# Patient Record
Sex: FEMALE | Race: WHITE | NOT HISPANIC OR LATINO | Employment: OTHER | URBAN - METROPOLITAN AREA
[De-identification: names, ages, dates, MRNs, and addresses within clinical notes are randomized per-mention and may not be internally consistent; named-entity substitution may affect disease eponyms.]

---

## 2017-08-28 ENCOUNTER — POST-OP (OUTPATIENT)
Dept: URBAN - METROPOLITAN AREA CLINIC 46 | Facility: CLINIC | Age: 67
End: 2017-08-28

## 2017-08-28 DIAGNOSIS — Z96.1: ICD-10-CM

## 2017-08-28 PROCEDURE — 99024 POSTOP FOLLOW-UP VISIT: CPT

## 2017-08-28 RX ORDER — FLUOROMETHOLONE 2.5 MG/ML: 1 SUSPENSION/ DROPS OPHTHALMIC EVERY MORNING

## 2017-08-28 ASSESSMENT — VISUAL ACUITY
OD_SC: J12
OS_PH: 20/40+1
OD_SC: 20/40
OS_SC: 20/60-1
OD_PH: 20/30
OS_SC: J12

## 2017-08-28 ASSESSMENT — TONOMETRY
OD_IOP_MMHG: 14
OS_IOP_MMHG: 14

## 2018-03-01 ENCOUNTER — FOLLOW UP (OUTPATIENT)
Dept: URBAN - METROPOLITAN AREA CLINIC 46 | Facility: CLINIC | Age: 68
End: 2018-03-01

## 2018-03-01 DIAGNOSIS — H18.51: ICD-10-CM

## 2018-03-01 DIAGNOSIS — H02.836: ICD-10-CM

## 2018-03-01 DIAGNOSIS — Z98.890: ICD-10-CM

## 2018-03-01 DIAGNOSIS — H02.833: ICD-10-CM

## 2018-03-01 PROCEDURE — 92012 INTRM OPH EXAM EST PATIENT: CPT

## 2018-03-01 ASSESSMENT — VISUAL ACUITY
OS_CC: J5
OD_CC: J3
OS_CC: 20/40
OD_SC: J10
OS_SC: 20/70
OD_SC: 20/50-2
OD_CC: 20/40-2
OS_SC: J>10

## 2018-03-01 ASSESSMENT — TONOMETRY
OD_IOP_MMHG: 14
OS_IOP_MMHG: 13

## 2018-03-26 ENCOUNTER — CONSULT (OUTPATIENT)
Dept: URBAN - METROPOLITAN AREA CLINIC 43 | Facility: CLINIC | Age: 68
End: 2018-03-26

## 2018-03-26 VITALS
HEIGHT: 55 IN | HEART RATE: 87 BPM | SYSTOLIC BLOOD PRESSURE: 148 MMHG | DIASTOLIC BLOOD PRESSURE: 85 MMHG | RESPIRATION RATE: 14 BRPM

## 2018-03-26 DIAGNOSIS — H02.836: ICD-10-CM

## 2018-03-26 DIAGNOSIS — H02.833: ICD-10-CM

## 2018-03-26 DIAGNOSIS — H18.51: ICD-10-CM

## 2018-03-26 PROCEDURE — 92014 COMPRE OPH EXAM EST PT 1/>: CPT

## 2018-03-26 RX ORDER — MOXIFLOXACIN HYDROCHLORIDE 5 MG/ML: 1 SOLUTION/ DROPS OPHTHALMIC

## 2018-03-26 RX ORDER — NEPAFENAC 3 MG/ML: 1 SUSPENSION/ DROPS OPHTHALMIC ONCE A DAY

## 2018-03-26 ASSESSMENT — TONOMETRY
OD_IOP_MMHG: 14
OS_IOP_MMHG: 14

## 2018-03-26 ASSESSMENT — VISUAL ACUITY
OS_SC: 20/200
OD_SC: 20/50+2

## 2019-03-06 ENCOUNTER — ESTABLISHED COMPREHENSIVE EXAM (OUTPATIENT)
Dept: URBAN - METROPOLITAN AREA CLINIC 46 | Facility: CLINIC | Age: 69
End: 2019-03-06

## 2019-03-06 DIAGNOSIS — H52.7: ICD-10-CM

## 2019-03-06 DIAGNOSIS — E11.9: ICD-10-CM

## 2019-03-06 PROCEDURE — 92014 COMPRE OPH EXAM EST PT 1/>: CPT

## 2019-03-06 PROCEDURE — 92015 DETERMINE REFRACTIVE STATE: CPT

## 2019-03-06 ASSESSMENT — VISUAL ACUITY
OD_SC: 20/50
OD_SC: J10
OD_PH: 20/40-1
OS_PH: 20/30-2
OS_SC: J10
OS_SC: 20/60-1

## 2019-03-06 ASSESSMENT — TONOMETRY
OD_IOP_MMHG: 12
OS_IOP_MMHG: 15

## 2019-10-14 NOTE — PATIENT DISCUSSION
"""Elevated IOP OU- patient states that she only used drop twice secondary to eyes being red after ""

## 2019-12-02 NOTE — PATIENT DISCUSSION
"""Reassured patient that intraocular pressures are at target levels and other ocular findings are stable. Continue present management and/or medication(s).  Follow up as directed. "" "" ."""

## 2020-01-13 ENCOUNTER — CORNEA CONSULT (OUTPATIENT)
Dept: URBAN - METROPOLITAN AREA CLINIC 43 | Facility: CLINIC | Age: 70
End: 2020-01-13

## 2020-01-13 VITALS — HEART RATE: 89 BPM | SYSTOLIC BLOOD PRESSURE: 151 MMHG | DIASTOLIC BLOOD PRESSURE: 91 MMHG | HEIGHT: 55 IN

## 2020-01-13 DIAGNOSIS — H18.51: ICD-10-CM

## 2020-01-13 DIAGNOSIS — H52.7: ICD-10-CM

## 2020-01-13 PROCEDURE — 92286 ANT SGM IMG I&R SPECLR MIC: CPT

## 2020-01-13 PROCEDURE — 92015 DETERMINE REFRACTIVE STATE: CPT

## 2020-01-13 PROCEDURE — 92014 COMPRE OPH EXAM EST PT 1/>: CPT

## 2020-01-13 RX ORDER — NEPAFENAC 3 MG/ML: 1 SUSPENSION/ DROPS OPHTHALMIC ONCE A DAY

## 2020-01-13 RX ORDER — MOXIFLOXACIN HYDROCHLORIDE 5 MG/ML: 1 SOLUTION/ DROPS OPHTHALMIC

## 2020-01-13 RX ORDER — PREDNISOLONE ACETATE 10 MG/ML: 1 SUSPENSION/ DROPS OPHTHALMIC

## 2020-01-13 ASSESSMENT — VISUAL ACUITY
OS_CC: J4
OU_SC: J10
OD_CC: J6
OU_CC: J1-
OS_PH: 20/40
OS_BAT: 20/80
OS_SC: 20/70-2
OD_SC: 20/40-1
OD_BAT: 20/60
OD_SC: J10
OU_SC: 20/40+1
OS_SC: <J12

## 2020-01-13 ASSESSMENT — TONOMETRY
OS_IOP_MMHG: 15
OD_IOP_MMHG: 15

## 2020-01-29 ENCOUNTER — ESTABLISHED PATIENT (OUTPATIENT)
Dept: URBAN - METROPOLITAN AREA SURGERY 14 | Facility: SURGERY | Age: 70
End: 2020-01-29

## 2020-01-29 ENCOUNTER — SURGERY/PROCEDURE (OUTPATIENT)
Dept: URBAN - METROPOLITAN AREA CLINIC 43 | Facility: CLINIC | Age: 70
End: 2020-01-29

## 2020-01-29 DIAGNOSIS — H18.51: ICD-10-CM

## 2020-01-29 PROCEDURE — 99211HP H&P OFFICE/OUTPATIENT VISIT, EST

## 2020-01-29 PROCEDURE — 65756 CORNEAL TRNSPL ENDOTHELIAL: CPT

## 2020-01-30 ENCOUNTER — 1 DAY POST-OP (OUTPATIENT)
Dept: URBAN - METROPOLITAN AREA CLINIC 46 | Facility: CLINIC | Age: 70
End: 2020-01-30

## 2020-01-30 DIAGNOSIS — Z94.7: ICD-10-CM

## 2020-01-30 PROCEDURE — 99024 POSTOP FOLLOW-UP VISIT: CPT

## 2020-01-30 ASSESSMENT — TONOMETRY: OS_IOP_MMHG: 15

## 2020-01-30 ASSESSMENT — VISUAL ACUITY
OD_SC: 20/>400
OS_SC: 20/70

## 2020-02-03 ENCOUNTER — 1 DAY POST-OP (OUTPATIENT)
Dept: URBAN - METROPOLITAN AREA CLINIC 46 | Facility: CLINIC | Age: 70
End: 2020-02-03

## 2020-02-03 DIAGNOSIS — Z94.7: ICD-10-CM

## 2020-02-03 PROCEDURE — 99024 POSTOP FOLLOW-UP VISIT: CPT

## 2020-02-03 ASSESSMENT — TONOMETRY: OD_IOP_MMHG: 16

## 2020-02-03 ASSESSMENT — VISUAL ACUITY
OS_SC: 20/70
OS_SC: J12
OD_PH: 20/60
OU_SC: 20/60-2
OD_SC: <J12
OD_SC: 20/70

## 2020-02-10 ENCOUNTER — POST-OP (OUTPATIENT)
Dept: URBAN - METROPOLITAN AREA CLINIC 43 | Facility: CLINIC | Age: 70
End: 2020-02-10

## 2020-02-10 DIAGNOSIS — Z98.890: ICD-10-CM

## 2020-02-10 PROCEDURE — 99024 POSTOP FOLLOW-UP VISIT: CPT

## 2020-02-10 RX ORDER — MOXIFLOXACIN HYDROCHLORIDE 5 MG/ML: 1 SOLUTION/ DROPS OPHTHALMIC

## 2020-02-10 ASSESSMENT — TONOMETRY
OD_IOP_MMHG: 16
OS_IOP_MMHG: 17

## 2020-02-10 ASSESSMENT — VISUAL ACUITY
OS_SC: 20/70-1
OD_SC: 20/100
OD_PH: 20/70+1

## 2020-03-02 ENCOUNTER — POST-OP (OUTPATIENT)
Dept: URBAN - METROPOLITAN AREA CLINIC 46 | Facility: CLINIC | Age: 70
End: 2020-03-02

## 2020-03-02 DIAGNOSIS — Z98.890: ICD-10-CM

## 2020-03-02 PROCEDURE — 99024 POSTOP FOLLOW-UP VISIT: CPT

## 2020-03-02 ASSESSMENT — TONOMETRY
OS_IOP_MMHG: 17
OD_IOP_MMHG: 17

## 2020-03-02 ASSESSMENT — VISUAL ACUITY
OD_PH: 20/40+1
OS_SC: 20/70
OD_SC: 20/80
OS_PH: 20/40-1

## 2020-04-13 ENCOUNTER — POST-OP (OUTPATIENT)
Dept: URBAN - METROPOLITAN AREA CLINIC 46 | Facility: CLINIC | Age: 70
End: 2020-04-13

## 2020-04-13 DIAGNOSIS — Z98.890: ICD-10-CM

## 2020-04-13 PROCEDURE — 99024 POSTOP FOLLOW-UP VISIT: CPT

## 2020-04-13 ASSESSMENT — VISUAL ACUITY
OS_SC: 20/60
OD_PH: 20/50
OS_PH: 20/40
OS_SC: J5
OD_SC: 20/100
OD_SC: J10

## 2020-04-13 ASSESSMENT — TONOMETRY
OS_IOP_MMHG: 20
OD_IOP_MMHG: 20

## 2020-12-30 ENCOUNTER — ESTABLISHED COMPREHENSIVE EXAM (OUTPATIENT)
Dept: URBAN - METROPOLITAN AREA CLINIC 46 | Facility: CLINIC | Age: 70
End: 2020-12-30

## 2020-12-30 DIAGNOSIS — E11.9: ICD-10-CM

## 2020-12-30 DIAGNOSIS — Z01.00: ICD-10-CM

## 2020-12-30 DIAGNOSIS — H52.7: ICD-10-CM

## 2020-12-30 PROCEDURE — 92015 DETERMINE REFRACTIVE STATE: CPT

## 2020-12-30 PROCEDURE — 92014 COMPRE OPH EXAM EST PT 1/>: CPT

## 2020-12-30 RX ORDER — PREDNISOLONE ACETATE 10 MG/ML: 1 SUSPENSION/ DROPS OPHTHALMIC

## 2020-12-30 ASSESSMENT — VISUAL ACUITY
OD_SC: 20/400
OS_SC: >J12
OS_PH: 20/30
OS_CC: J1
OD_PH: 20/50
OD_SC: >J12
OS_SC: 20/70-2
OD_CC: J6

## 2020-12-30 ASSESSMENT — TONOMETRY
OS_IOP_MMHG: 16
OD_IOP_MMHG: 17

## 2021-05-05 NOTE — PATIENT DISCUSSION
will watch.  continue timolol bid ou.  needs rv soon.  no showed times 2.  please call office for pressure check soon.

## 2022-03-31 ENCOUNTER — COMPREHENSIVE EXAM (OUTPATIENT)
Dept: URBAN - METROPOLITAN AREA CLINIC 46 | Facility: CLINIC | Age: 72
End: 2022-03-31

## 2022-03-31 DIAGNOSIS — Z94.7: ICD-10-CM

## 2022-03-31 DIAGNOSIS — Z01.00: ICD-10-CM

## 2022-03-31 DIAGNOSIS — H52.7: ICD-10-CM

## 2022-03-31 DIAGNOSIS — E11.9: ICD-10-CM

## 2022-03-31 PROCEDURE — 92015 DETERMINE REFRACTIVE STATE: CPT

## 2022-03-31 PROCEDURE — 92014 COMPRE OPH EXAM EST PT 1/>: CPT

## 2022-03-31 ASSESSMENT — VISUAL ACUITY
OD_SC: 20/200
OS_SC: 20/60
OS_CC: 20/40-1
OD_CC: 20/50-1
OS_SC: <J10
OD_PH: 20/30-1
OD_SC: <J10
OS_CC: J5
OD_CC: J3

## 2022-03-31 ASSESSMENT — TONOMETRY
OS_IOP_MMHG: 17
OD_IOP_MMHG: 17

## 2022-07-29 NOTE — PATIENT DISCUSSION
BASIC OS/OD/DIST OU: Discussed with patient in detail laser-assisted vs traditional cataract surgery and all available lens options as well as their associated risks, benefits, limitations and out-of-pocket costs. Patient wishes to proceed with cataract surgery with the Basic option OS. Proceed with cataract surgery OD with Basic option, based on confirmation of first eye results, and patient's complaint. The patient understands that a monofocal IOL will allow him/her to see clearly at one focal point and elects to be best corrected at distance. Patient understands that glasses will be needed full time for near and intermediate work after surgery and will likely still be needed to see their best at distance. The risks, benefits, and alternatives to surgery were discussed and the patient's questions were answered.

## 2023-04-03 ENCOUNTER — COMPREHENSIVE EXAM (OUTPATIENT)
Dept: URBAN - METROPOLITAN AREA CLINIC 46 | Facility: CLINIC | Age: 73
End: 2023-04-03

## 2023-04-03 DIAGNOSIS — Z94.7: ICD-10-CM

## 2023-04-03 DIAGNOSIS — H52.7: ICD-10-CM

## 2023-04-03 DIAGNOSIS — E11.9: ICD-10-CM

## 2023-04-03 DIAGNOSIS — Z98.890: ICD-10-CM

## 2023-04-03 DIAGNOSIS — Z96.1: ICD-10-CM

## 2023-04-03 DIAGNOSIS — H35.54: ICD-10-CM

## 2023-04-03 DIAGNOSIS — Z01.00: ICD-10-CM

## 2023-04-03 PROCEDURE — 92014 COMPRE OPH EXAM EST PT 1/>: CPT

## 2023-04-03 PROCEDURE — 92015 DETERMINE REFRACTIVE STATE: CPT

## 2023-04-03 PROCEDURE — 92134 CPTRZ OPH DX IMG PST SGM RTA: CPT

## 2023-04-03 ASSESSMENT — VISUAL ACUITY
OS_CC: J2
OS_CC: 20/25
OD_SC: 20/60
OD_CC: 20/40-2
OD_CC: J3
OS_SC: 20/30-1
OD_PH: 20/30-2
OU_CC: J2

## 2023-04-03 ASSESSMENT — TONOMETRY
OD_IOP_MMHG: 17
OS_IOP_MMHG: 17

## 2023-04-24 ENCOUNTER — FOLLOW UP (OUTPATIENT)
Dept: URBAN - METROPOLITAN AREA CLINIC 46 | Facility: CLINIC | Age: 73
End: 2023-04-24

## 2023-04-24 DIAGNOSIS — Z94.7: ICD-10-CM

## 2023-04-24 DIAGNOSIS — Z98.890: ICD-10-CM

## 2023-04-24 DIAGNOSIS — Z01.00: ICD-10-CM

## 2023-04-24 DIAGNOSIS — Z96.1: ICD-10-CM

## 2023-04-24 DIAGNOSIS — H52.7: ICD-10-CM

## 2023-04-24 DIAGNOSIS — E11.9: ICD-10-CM

## 2023-04-24 DIAGNOSIS — H35.54: ICD-10-CM

## 2023-04-24 PROCEDURE — 92012 INTRM OPH EXAM EST PATIENT: CPT

## 2023-04-24 ASSESSMENT — TONOMETRY
OD_IOP_MMHG: 18
OS_IOP_MMHG: 17

## 2023-04-24 ASSESSMENT — VISUAL ACUITY
OU_CC: 20/20
OD_CC: 20/40-1
OS_CC: 20/20
OD_PH: 20/25

## 2023-12-12 NOTE — PATIENT DISCUSSION
Continue taking Lutein. I reviewed the H&P, I examined the patient, and there are no changes in the patient's condition.

## 2024-03-18 ENCOUNTER — COMPREHENSIVE EXAM (OUTPATIENT)
Dept: URBAN - METROPOLITAN AREA CLINIC 46 | Facility: CLINIC | Age: 74
End: 2024-03-18

## 2024-03-18 DIAGNOSIS — H18.593: ICD-10-CM

## 2024-03-18 DIAGNOSIS — E11.9: ICD-10-CM

## 2024-03-18 DIAGNOSIS — Z96.1: ICD-10-CM

## 2024-03-18 DIAGNOSIS — Z94.7: ICD-10-CM

## 2024-03-18 DIAGNOSIS — H35.54: ICD-10-CM

## 2024-03-18 DIAGNOSIS — Z98.890: ICD-10-CM

## 2024-03-18 PROCEDURE — 92014 COMPRE OPH EXAM EST PT 1/>: CPT

## 2024-03-18 PROCEDURE — 92015 DETERMINE REFRACTIVE STATE: CPT

## 2024-03-18 ASSESSMENT — VISUAL ACUITY
OD_CC: 20/50+1
OU_CC: J1
OS_CC: J1
OD_PH: 20/25-2
OD_SC: 20/70
OS_SC: J10
OD_SC: J12
OU_SC: 20/40-2
OS_CC: 20/20-1
OS_SC: 20/50
OD_CC: J3
OU_CC: 20/20
OU_SC: J8

## 2024-03-18 ASSESSMENT — TONOMETRY
OD_IOP_MMHG: 15
OS_IOP_MMHG: 17

## 2025-03-27 ENCOUNTER — FOLLOW UP (OUTPATIENT)
Age: 75
End: 2025-03-27

## 2025-03-27 DIAGNOSIS — Z94.7: ICD-10-CM

## 2025-03-27 DIAGNOSIS — H35.54: ICD-10-CM

## 2025-03-27 DIAGNOSIS — H18.593: ICD-10-CM

## 2025-03-27 DIAGNOSIS — Z96.1: ICD-10-CM

## 2025-03-27 DIAGNOSIS — D31.91: ICD-10-CM

## 2025-03-27 DIAGNOSIS — Z79.4: ICD-10-CM

## 2025-03-27 DIAGNOSIS — E11.9: ICD-10-CM

## 2025-03-27 DIAGNOSIS — Z98.890: ICD-10-CM

## 2025-03-27 PROCEDURE — 99214 OFFICE O/P EST MOD 30 MIN: CPT
